# Patient Record
Sex: FEMALE | Race: WHITE | Employment: FULL TIME | URBAN - METROPOLITAN AREA
[De-identification: names, ages, dates, MRNs, and addresses within clinical notes are randomized per-mention and may not be internally consistent; named-entity substitution may affect disease eponyms.]

---

## 2024-03-28 ENCOUNTER — OFFICE VISIT (OUTPATIENT)
Age: 39
End: 2024-03-28

## 2024-03-28 VITALS
BODY MASS INDEX: 21.62 KG/M2 | HEART RATE: 66 BPM | TEMPERATURE: 98.6 F | HEIGHT: 70 IN | OXYGEN SATURATION: 100 % | DIASTOLIC BLOOD PRESSURE: 70 MMHG | SYSTOLIC BLOOD PRESSURE: 111 MMHG | WEIGHT: 151 LBS | RESPIRATION RATE: 16 BRPM

## 2024-03-28 DIAGNOSIS — F41.1 GAD (GENERALIZED ANXIETY DISORDER): ICD-10-CM

## 2024-03-28 DIAGNOSIS — Z00.00 ENCOUNTER FOR MEDICAL EXAMINATION TO ESTABLISH CARE: Primary | ICD-10-CM

## 2024-03-28 DIAGNOSIS — Z00.00 WELLNESS EXAMINATION: ICD-10-CM

## 2024-03-28 DIAGNOSIS — Z80.51 FAMILY HISTORY OF RENAL CELL CARCINOMA: ICD-10-CM

## 2024-03-28 RX ORDER — SERTRALINE HYDROCHLORIDE 25 MG/1
25 TABLET, FILM COATED ORAL DAILY
Qty: 30 TABLET | Refills: 5 | Status: SHIPPED | OUTPATIENT
Start: 2024-03-28

## 2024-03-28 SDOH — ECONOMIC STABILITY: FOOD INSECURITY: WITHIN THE PAST 12 MONTHS, YOU WORRIED THAT YOUR FOOD WOULD RUN OUT BEFORE YOU GOT MONEY TO BUY MORE.: NEVER TRUE

## 2024-03-28 SDOH — ECONOMIC STABILITY: INCOME INSECURITY: HOW HARD IS IT FOR YOU TO PAY FOR THE VERY BASICS LIKE FOOD, HOUSING, MEDICAL CARE, AND HEATING?: NOT HARD AT ALL

## 2024-03-28 SDOH — ECONOMIC STABILITY: FOOD INSECURITY: WITHIN THE PAST 12 MONTHS, THE FOOD YOU BOUGHT JUST DIDN'T LAST AND YOU DIDN'T HAVE MONEY TO GET MORE.: NEVER TRUE

## 2024-03-28 SDOH — ECONOMIC STABILITY: HOUSING INSECURITY
IN THE LAST 12 MONTHS, WAS THERE A TIME WHEN YOU DID NOT HAVE A STEADY PLACE TO SLEEP OR SLEPT IN A SHELTER (INCLUDING NOW)?: NO

## 2024-03-28 ASSESSMENT — PATIENT HEALTH QUESTIONNAIRE - PHQ9
1. LITTLE INTEREST OR PLEASURE IN DOING THINGS: NOT AT ALL
SUM OF ALL RESPONSES TO PHQ QUESTIONS 1-9: 0
SUM OF ALL RESPONSES TO PHQ9 QUESTIONS 1 & 2: 0
SUM OF ALL RESPONSES TO PHQ QUESTIONS 1-9: 0
SUM OF ALL RESPONSES TO PHQ QUESTIONS 1-9: 0
2. FEELING DOWN, DEPRESSED OR HOPELESS: NOT AT ALL
SUM OF ALL RESPONSES TO PHQ QUESTIONS 1-9: 0

## 2024-03-28 ASSESSMENT — ENCOUNTER SYMPTOMS
ABDOMINAL PAIN: 0
EYE DISCHARGE: 0
CONSTIPATION: 0
CHEST TIGHTNESS: 0
SHORTNESS OF BREATH: 0
EYE PAIN: 0
DIARRHEA: 0
BACK PAIN: 0
EYE ITCHING: 0

## 2024-03-28 NOTE — PROGRESS NOTES
Cardiovascular:      Rate and Rhythm: Normal rate and regular rhythm.      Heart sounds: No murmur heard.     Comments: ? Very faint ELLA  Pulmonary:      Effort: Pulmonary effort is normal.      Breath sounds: Normal breath sounds. No wheezing.   Abdominal:      General: There is no distension.      Palpations: Abdomen is soft.   Musculoskeletal:         General: No swelling.   Skin:     General: Skin is warm and dry.   Neurological:      General: No focal deficit present.      Mental Status: She is alert.   Psychiatric:         Mood and Affect: Mood normal.         Behavior: Behavior normal.         Current Outpatient Medications   Medication Sig    sertraline (ZOLOFT) 25 MG tablet Take 1 tablet by mouth daily     No current facility-administered medications for this visit.        Past Medical History:   Diagnosis Date    Anxiety       Past Surgical History:   Procedure Laterality Date    MENISCECTOMY      bilateral knees; 1998, 1999    OTHER SURGICAL HISTORY  2012    cervical leep    VAGINAL DELIVERY      x2, 2015, 2019    WISDOM TOOTH EXTRACTION        Social History     Tobacco Use    Smoking status: Never    Smokeless tobacco: Never   Substance Use Topics    Alcohol use: Yes     Comment: occ      Family History   Problem Relation Age of Onset    Heart Disease Mother     Kidney Cancer Father         No Known Allergies     Assessment/Plan  Rebecca was seen today for new patient.    Diagnoses and all orders for this visit:    Encounter for medical examination to establish care- history of ABEBE. Affecting quality of life. Had w/d with lexapro, but has been helpful in the past. Will try low dose zoloft. Will let us know how she does with this.  Side effects and efficacy of medication discussed.    ABEBE (generalized anxiety disorder)  -     sertraline (ZOLOFT) 25 MG tablet; Take 1 tablet by mouth daily    Family history of renal cell carcinoma- consider US at 50.     Wellness examination- CAD on mother's side.   -

## 2024-04-19 ENCOUNTER — TELEPHONE (OUTPATIENT)
Age: 39
End: 2024-04-19

## 2024-04-19 NOTE — TELEPHONE ENCOUNTER
Lab results printed via [employer] website and placed in Dr. De La Cruz's folder for review.  Patient stated that she was started on a rx for Vitamin D.

## 2024-04-19 NOTE — TELEPHONE ENCOUNTER
----- Message from Hannah North sent at 4/19/2024  9:34 AM EDT -----  Subject: Message to Provider    QUESTIONS  Information for Provider? Patient wants to know if the office of [facility] sent over her labs last week or this week. Dr. De La Cruz ordered   them and the GYN just did then them. Please call to advise.   ---------------------------------------------------------------------------  --------------  CALL BACK INFO  [phone]; OK to leave message on voicemail  ---------------------------------------------------------------------------  --------------  SCRIPT ANSWERS  Relationship to Patient? Self

## 2024-05-27 ENCOUNTER — PATIENT MESSAGE (OUTPATIENT)
Age: 39
End: 2024-05-27

## 2024-05-28 NOTE — TELEPHONE ENCOUNTER
From: Rebecca Price  To: Dr. Ruslan De La Cruz  Sent: 5/27/2024 8:25 PM EDT  Subject: Sertraline    Hi there,  I've been taking the Sertraline 25mg with decent effect. I increased to 50mg a few days ago and think this will be better fit. Are you able to increase the Rx when you get a chance? Thanks o much!    Karly Price

## 2024-08-22 ENCOUNTER — TELEMEDICINE (OUTPATIENT)
Age: 39
End: 2024-08-22
Payer: COMMERCIAL

## 2024-08-22 DIAGNOSIS — F41.1 GAD (GENERALIZED ANXIETY DISORDER): Primary | ICD-10-CM

## 2024-08-22 DIAGNOSIS — R63.5 WEIGHT GAIN: ICD-10-CM

## 2024-08-22 PROCEDURE — 99213 OFFICE O/P EST LOW 20 MIN: CPT | Performed by: INTERNAL MEDICINE

## 2024-08-22 RX ORDER — NORETHINDRONE ACETATE AND ETHINYL ESTRADIOL, ETHINYL ESTRADIOL AND FERROUS FUMARATE 1MG-10(24)
KIT ORAL DAILY
COMMUNITY
Start: 2024-05-01

## 2024-08-22 RX ORDER — ESCITALOPRAM OXALATE 10 MG/1
10 TABLET ORAL DAILY
Qty: 90 TABLET | Refills: 1 | Status: SHIPPED | OUTPATIENT
Start: 2024-08-22

## 2024-08-22 ASSESSMENT — ENCOUNTER SYMPTOMS
ABDOMINAL PAIN: 0
BACK PAIN: 0
SHORTNESS OF BREATH: 0
DIARRHEA: 0
CONSTIPATION: 0
CHEST TIGHTNESS: 0

## 2024-08-22 NOTE — PROGRESS NOTES
daily          No follow-up provider specified.      Rebecca Price, was evaluated through a synchronous (real-time) audio-video encounter. The patient (or guardian if applicable) is aware that this is a billable service, which includes applicable co-pays. This Virtual Visit was conducted with patient's (and/or legal guardian's) consent. Patient identification was verified, and a caregiver was present when appropriate.   The patient was located at Home: [address] [place] [address]  Provider was located at Facility (Appt Dept): [address] [place], [place] [id number]  Confirm you are appropriately licensed, registered, or certified to deliver care in the state where the patient is located as indicated above. If you are not or unsure, please re-schedule the visit: Yes, I confirm.      Total time spent for this encounter: Not billed by time    --Ruslan De La Cruz MD on 8/22/2024 at 4:16 PM    An electronic signature was used to authenticate this note.      Ruslan De La Cruz MD  8/22/2024    This note was created with the help of speech recognition software (Dragon) and may contain some 'sound alike' errors.

## 2024-12-20 ENCOUNTER — OFFICE VISIT (OUTPATIENT)
Facility: CLINIC | Age: 39
End: 2024-12-20

## 2024-12-20 VITALS
OXYGEN SATURATION: 98 % | HEART RATE: 82 BPM | BODY MASS INDEX: 23.05 KG/M2 | DIASTOLIC BLOOD PRESSURE: 68 MMHG | TEMPERATURE: 99 F | WEIGHT: 161 LBS | SYSTOLIC BLOOD PRESSURE: 108 MMHG | HEIGHT: 70 IN | RESPIRATION RATE: 16 BRPM

## 2024-12-20 DIAGNOSIS — J06.9 UPPER RESPIRATORY TRACT INFECTION, UNSPECIFIED TYPE: Primary | ICD-10-CM

## 2024-12-20 DIAGNOSIS — R05.9 COUGH, UNSPECIFIED TYPE: ICD-10-CM

## 2024-12-20 LAB
EXP DATE SOLUTION: NORMAL
EXP DATE SWAB: NORMAL
EXPIRATION DATE: NORMAL
INFLUENZA A ANTIGEN, POC: NEGATIVE
INFLUENZA B ANTIGEN, POC: NEGATIVE
LOT NUMBER POC: NORMAL
LOT NUMBER SOLUTION: NORMAL
LOT NUMBER SWAB: NORMAL
SARS-COV-2 RNA, POC: NEGATIVE
VALID INTERNAL CONTROL, POC: YES

## 2024-12-20 RX ORDER — MULTIVITAMIN WITH IRON
1 TABLET ORAL DAILY
COMMUNITY

## 2024-12-20 RX ORDER — PREDNISONE 20 MG/1
40 TABLET ORAL DAILY
Qty: 10 TABLET | Refills: 0 | Status: SHIPPED | OUTPATIENT
Start: 2024-12-20 | End: 2024-12-25

## 2024-12-20 ASSESSMENT — ENCOUNTER SYMPTOMS
SHORTNESS OF BREATH: 1
WHEEZING: 1
COUGH: 1
CONSTIPATION: 0
DIARRHEA: 0
ABDOMINAL PAIN: 0
CHEST TIGHTNESS: 0
BACK PAIN: 0

## 2024-12-20 NOTE — PROGRESS NOTES
98%       Physical Exam  Constitutional:       Appearance: Normal appearance. She is ill-appearing.   HENT:      Head: Normocephalic.      Right Ear: Tympanic membrane and ear canal normal.      Left Ear: Tympanic membrane and ear canal normal.   Cardiovascular:      Rate and Rhythm: Normal rate and regular rhythm.      Heart sounds: No murmur heard.  Pulmonary:      Effort: Pulmonary effort is normal.      Breath sounds: Normal breath sounds. No wheezing.      Comments: Relatively clear breath sounds with slight wheezing.  No egophony  Abdominal:      General: There is no distension.      Palpations: Abdomen is soft.   Musculoskeletal:         General: No swelling.   Skin:     General: Skin is warm and dry.   Neurological:      General: No focal deficit present.      Mental Status: She is alert.   Psychiatric:         Mood and Affect: Mood normal.         Behavior: Behavior normal.           Current Outpatient Medications   Medication Sig    Multiple Vitamin (MULTIVITAMIN) TABS tablet Take 1 tablet by mouth daily    predniSONE (DELTASONE) 20 MG tablet Take 2 tablets by mouth daily for 5 days    LO LOESTRIN FE 1 MG-10 MCG / 10 MCG tablet daily    escitalopram (LEXAPRO) 10 MG tablet Take 1 tablet by mouth daily     No current facility-administered medications for this visit.        Past Medical History:   Diagnosis Date    Anxiety       Past Surgical History:   Procedure Laterality Date    MENISCECTOMY      bilateral knees; 1998, 1999    OTHER SURGICAL HISTORY  2012    cervical leep    VAGINAL DELIVERY      x2, 2015, 2019    WISDOM TOOTH EXTRACTION        Social History     Tobacco Use    Smoking status: Never    Smokeless tobacco: Never   Substance Use Topics    Alcohol use: Yes     Comment: occ      Family History   Problem Relation Age of Onset    Heart Disease Mother     Kidney Cancer Father         No Known Allergies     Assessment/Plan  Rebecca was seen today for cough.    Diagnoses and all orders for this

## 2024-12-23 ENCOUNTER — TELEPHONE (OUTPATIENT)
Facility: CLINIC | Age: 39
End: 2024-12-23

## 2024-12-23 NOTE — TELEPHONE ENCOUNTER
Patient called stating the Dr said he would send in antibiotics yesterday 12/22 for chest and sinus congestion but they were never sent in, she would like to pick them up today.    Send to [facility] [id number] - [place], [place] - [address] [phone] - F [phone] [[id number]]

## 2025-02-27 RX ORDER — ESCITALOPRAM OXALATE 10 MG/1
10 TABLET ORAL DAILY
Qty: 30 TABLET | Refills: 1 | Status: SHIPPED | OUTPATIENT
Start: 2025-02-27

## 2025-04-24 RX ORDER — ESCITALOPRAM OXALATE 10 MG/1
10 TABLET ORAL DAILY
Qty: 30 TABLET | Refills: 1 | Status: SHIPPED | OUTPATIENT
Start: 2025-04-24

## 2025-06-24 RX ORDER — ESCITALOPRAM OXALATE 10 MG/1
10 TABLET ORAL DAILY
Qty: 30 TABLET | Refills: 1 | Status: SHIPPED | OUTPATIENT
Start: 2025-06-24

## 2025-08-24 RX ORDER — ESCITALOPRAM OXALATE 10 MG/1
10 TABLET ORAL DAILY
Qty: 30 TABLET | Refills: 0 | Status: SHIPPED | OUTPATIENT
Start: 2025-08-24